# Patient Record
Sex: MALE | URBAN - METROPOLITAN AREA
[De-identification: names, ages, dates, MRNs, and addresses within clinical notes are randomized per-mention and may not be internally consistent; named-entity substitution may affect disease eponyms.]

---

## 2023-11-16 ENCOUNTER — APPOINTMENT (RX ONLY)
Dept: URBAN - METROPOLITAN AREA CLINIC 102 | Facility: CLINIC | Age: 23
Setting detail: DERMATOLOGY
End: 2023-11-16

## 2023-11-16 DIAGNOSIS — L21.8 OTHER SEBORRHEIC DERMATITIS: ICD-10-CM

## 2023-11-16 DIAGNOSIS — L65.0 TELOGEN EFFLUVIUM: ICD-10-CM

## 2023-11-16 PROCEDURE — 99203 OFFICE O/P NEW LOW 30 MIN: CPT

## 2023-11-16 PROCEDURE — ? PRESCRIPTION

## 2023-11-16 PROCEDURE — ? TREATMENT REGIMEN

## 2023-11-16 PROCEDURE — ? COUNSELING

## 2023-11-16 RX ORDER — KETOCONAZOLE 20 MG/ML
SHAMPOO, SUSPENSION TOPICAL BIW
Qty: 120 | Refills: 3 | Status: ERX | COMMUNITY
Start: 2023-11-16

## 2023-11-16 RX ADMIN — KETOCONAZOLE: 20 SHAMPOO, SUSPENSION TOPICAL at 00:00

## 2023-11-16 ASSESSMENT — LOCATION ZONE DERM: LOCATION ZONE: SCALP

## 2023-11-16 ASSESSMENT — LOCATION SIMPLE DESCRIPTION DERM: LOCATION SIMPLE: SCALP

## 2023-11-16 ASSESSMENT — LOCATION DETAILED DESCRIPTION DERM: LOCATION DETAILED: LEFT SUPERIOR PARIETAL SCALP

## 2024-02-20 ENCOUNTER — APPOINTMENT (RX ONLY)
Dept: URBAN - METROPOLITAN AREA CLINIC 102 | Facility: CLINIC | Age: 24
Setting detail: DERMATOLOGY
End: 2024-02-20

## 2024-02-20 DIAGNOSIS — L21.8 OTHER SEBORRHEIC DERMATITIS: ICD-10-CM

## 2024-02-20 DIAGNOSIS — L65.0 TELOGEN EFFLUVIUM: ICD-10-CM

## 2024-02-20 PROCEDURE — ? COUNSELING

## 2024-02-20 PROCEDURE — ? TREATMENT REGIMEN

## 2024-02-20 PROCEDURE — ? PRESCRIPTION

## 2024-02-20 PROCEDURE — 99214 OFFICE O/P EST MOD 30 MIN: CPT

## 2024-02-20 RX ORDER — MINOXIDIL 2.5 MG/1
TABLET ORAL
Qty: 90 | Refills: 1 | Status: ERX | COMMUNITY
Start: 2024-02-20

## 2024-02-20 RX ADMIN — MINOXIDIL: 2.5 TABLET ORAL at 00:00

## 2024-02-20 ASSESSMENT — LOCATION DETAILED DESCRIPTION DERM: LOCATION DETAILED: LEFT SUPERIOR PARIETAL SCALP

## 2024-02-20 ASSESSMENT — LOCATION ZONE DERM: LOCATION ZONE: SCALP

## 2024-02-20 ASSESSMENT — LOCATION SIMPLE DESCRIPTION DERM: LOCATION SIMPLE: SCALP

## 2024-02-20 NOTE — PROCEDURE: TREATMENT REGIMEN
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 Patient understood, will follow-up in 3 months.
Detail Level: Zone
Initiate Treatment: minoxidil 2.5 mg tablet \\nSig: Take one tablet daily.
Continue Regimen: ketoconazole 2 % shampoo \\nSig: Massage into scalp and leave on for 5 minutes then rinse. Advised to increase to 5+ x a week as needed for flakes/scale. Pt demonstrated understanding

## 2024-03-14 NOTE — PROCEDURE: COUNSELING
Detail Level: Zone What is skin cancer?  Skin cancer is unfortunately very common. That's why we are here to help you on your journey to healthy happy skin! There are two main types of skin cancer: melanoma and non-melanoma skin cancer. Melanoma is a form of skin cancer that often arises within an existing nevus or mole. However, this is not always the case. Melanoma can arise anywhere (not only where you have moles right now). Melanoma can run in families, so letting us know about your family history is important. Non-melanoma skin cancer is the most common type of cancer in the [place]. The two main types of non-melanoma skin cancers are basal cell carcinomas (BCC) and squamous cell carcinoma (SCC). These cancers tend to be less aggressive than melanomas but are still important to look for and treat.    What can I do to prevent skin cancer?  One of the largest risk factors for skin cancer is sun exposure or UV radiation. Therefore, sun protection is feliz! Here are some great tips for protecting yourself!  Try to avoid direct sun exposure during peak sun hours (10 AM to 2 PM)  Remember you get A LOT of sun even under cloud coverage and through care windows!  When choosing a sunscreen, look for one that says “broad spectrum” sunscreen. This means it protects you from more of the harmful UV rays.   Choose a sunscreen that is SPF 30 or greater for best protection.   Apply sunscreen to all sun-exposed skin and reapply every 2 hours.   Consider sun protective clothing! Great additions to your sun protective clothing wardrobe include broad brimmed hats, sunglasses, UPF clothing.  Avoid tanning salons. These have been shown to be very harmful in terms of your risk of skin cancer.   Avoid “base tans”. We now know that tans are dangerous (not just sun burns). If you want to have a tan for a trip, consider a spray tan!    Should I check my skin at home between my dermatology appointments?  Yes! It's always a great idea to look at your  skin on a regular basis. Here are some things to look for when monitoring your skin.   For melanoma, look for the ABCDE's!  A = Asymmetry. Look for a spot where one half does not match the other!  B = Borders. Look for a spot that has jagged, ragged or irregular borders.  C = Color. Look for a spot that is not evenly colored and often includes multiple colors, especially true black, red, white, blue, grey.   D = Diameter. Look for a spot that is larger than the size of a pencil eraser.  E = Evolution. If you ever have a spot that is changing in shape, color, size or symptoms (becomes itchy, painful or starts to bleed), always call us!  For non-melanoma skin cancers, look for a new, pink spot that is not going away, especially one that is itchy, painful or bleeding.     What should I do if I see a spot that is concerning for melanoma or non-melanoma skin cancer?  If you are ever concerned, call us! Do not wait for your next appointment. We want to help!